# Patient Record
Sex: MALE | Race: WHITE | NOT HISPANIC OR LATINO | Employment: STUDENT | URBAN - METROPOLITAN AREA
[De-identification: names, ages, dates, MRNs, and addresses within clinical notes are randomized per-mention and may not be internally consistent; named-entity substitution may affect disease eponyms.]

---
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
## 2025-05-28 ENCOUNTER — OFFICE VISIT (OUTPATIENT)
Dept: FAMILY MEDICINE CLINIC | Facility: CLINIC | Age: 20
End: 2025-05-28

## 2025-05-28 VITALS
BODY MASS INDEX: 17.91 KG/M2 | SYSTOLIC BLOOD PRESSURE: 116 MMHG | HEART RATE: 108 BPM | OXYGEN SATURATION: 98 % | WEIGHT: 124.8 LBS | TEMPERATURE: 100.7 F | DIASTOLIC BLOOD PRESSURE: 82 MMHG

## 2025-05-28 DIAGNOSIS — Z00.00 ANNUAL PHYSICAL EXAM: Primary | ICD-10-CM

## 2025-05-28 DIAGNOSIS — M25.522 LEFT ELBOW PAIN: ICD-10-CM

## 2025-05-28 PROCEDURE — 99395 PREV VISIT EST AGE 18-39: CPT | Performed by: FAMILY MEDICINE

## 2025-05-28 NOTE — PROGRESS NOTES
Chief Complaint  Establish Care    Subjective      Francisco Ramirez is a 19 y.o. male who presents to [facility]     Patient here to establish care.     PMH: None  SH: vapes, no alcohol use, no drug use    A couple years ago he broke his left elbow and took the cast off about 2 weeks early. Sometimes when he lifts something, it hurts for a while, or sometimes it will hurt out of the blue. When it does hurt, it hurts on the lateral elbow and forearm. Doesn't always hurt but when it does it makes him stop what he's doing, such as lifting. The elbow pain is more recent - only 2-3 months ago. No injury around that time. He takes tylenol and ibuprofen when the pain occurs.    No chest pain, no dyspnea, no problems going to the bathroom, no hearing difficulties.  He says he has a little blurry vision at long distances and he used to have glasses but they broke a while ago and he has not seen an eye doctor in a while since he has been without insurance.  He is working on getting his insurance figured out.    Objective   Vital Signs:   Vitals:    05/28/25 1559   BP: 116/82   BP Location: Right arm   Patient Position: Sitting   Cuff Size: Adult   Pulse: 108   Temp: (!) 100.7 °F (38.2 °C)   TempSrc: Infrared   SpO2: 98%   Weight: 56.6 kg (124 lb 12.8 oz)     Body mass index is 17.91 kg/m².    Wt Readings from Last 3 Encounters:   05/28/25 56.6 kg (124 lb 12.8 oz) (7%, Z= -1.51)*   03/29/24 56.6 kg (124 lb 12.5 oz) (9%, Z= -1.34)*   01/13/23 54.5 kg (120 lb 4 oz) (10%, Z= -1.31)*     * Growth percentiles are based on [facility] (Boys, 2-20 Years) data.     BP Readings from Last 3 Encounters:   05/28/25 116/82   03/29/24 112/68   01/13/23 104/68 (11%, Z = -1.23 /  50%, Z = 0.00)*     *BP percentiles are based on the 2017 AAP Clinical Practice Guideline for boys       Health Maintenance   Topic Date Due    MENINGOCOCCAL B VACCINE (1 of 2 - Standard) 06/22/2021    HEPATITIS C SCREENING  Never done     COVID-19 Vaccine (3 - 2024-25 season) 11/28/2025 (Originally 9/1/2024)    INFLUENZA VACCINE  07/01/2025    ANNUAL PHYSICAL  05/28/2026    TDAP/TD VACCINES (2 - Td or Tdap) 08/01/2026    Pneumococcal Vaccine 0-49  Completed    MENINGOCOCCAL VACCINE  Completed    HPV VACCINES  Completed       Physical Exam  Vitals and nursing note reviewed.   Constitutional:       General: He is not in acute distress.  Cardiovascular:      Rate and Rhythm: Normal rate and regular rhythm.      Heart sounds: Normal heart sounds.   Pulmonary:      Effort: Pulmonary effort is normal. No respiratory distress.      Breath sounds: Normal breath sounds.   Musculoskeletal:      Comments: Left shoulder, elbow, and wrist range of motion is full.  No tenderness on left elbow or forearm.  5 out of 5 muscle strength in biceps and triceps bilaterally.   Neurological:      Mental Status: He is alert.   Psychiatric:         Mood and Affect: Mood normal.         Behavior: Behavior normal.          Result Review :  The following data was reviewed by: Hakeem Gil MD on 05/28/2025:         Procedures          Assessment & Plan  Annual physical exam  He is a healthy 19-year-old man on no medications at this time.  Up-to-date on preventative health.       Left elbow pain  Discussed getting an x-ray of his left elbow but given that he does not have his insurance info we are holding off on that for the time being.  He is getting the insurance figured out and when he does he will let me know so I can order the x-ray and he can get that done.  In the meantime he is using Tylenol and ibuprofen as needed for the pain.  He can also use ice to help with any pain and swelling.       I also recommended he establish with an eye doctor    Pediatric BMI = <1 %ile (Z= -2.36) based on CDC (Boys, 2-20 Years) BMI-for-age data using weight from 5/28/2025 and height from 3/29/2024..          FOLLOW UP  Return if symptoms worsen or fail to improve.  Patient was given  instructions and counseling regarding his condition or for health maintenance advice. Please see specific information pulled into the AVS if appropriate.       Hakeem Gil MD  05/28/25  16:17 EDT    CURRENT & DISCONTINUED MEDICATIONS  No current outpatient medications    Medications Discontinued During This Encounter   Medication Reason    NON FORMULARY *Therapy completed